# Patient Record
Sex: MALE | Race: BLACK OR AFRICAN AMERICAN | Employment: OTHER | URBAN - METROPOLITAN AREA
[De-identification: names, ages, dates, MRNs, and addresses within clinical notes are randomized per-mention and may not be internally consistent; named-entity substitution may affect disease eponyms.]

---

## 2017-07-15 ENCOUNTER — APPOINTMENT (OUTPATIENT)
Dept: CT IMAGING | Age: 42
End: 2017-07-15
Attending: EMERGENCY MEDICINE
Payer: OTHER GOVERNMENT

## 2017-07-15 ENCOUNTER — HOSPITAL ENCOUNTER (EMERGENCY)
Age: 42
Discharge: OTHER INSTITUTION | End: 2017-07-15
Attending: EMERGENCY MEDICINE
Payer: OTHER GOVERNMENT

## 2017-07-15 VITALS
DIASTOLIC BLOOD PRESSURE: 107 MMHG | BODY MASS INDEX: 21.67 KG/M2 | TEMPERATURE: 98.5 F | HEART RATE: 98 BPM | WEIGHT: 160 LBS | OXYGEN SATURATION: 98 % | SYSTOLIC BLOOD PRESSURE: 168 MMHG | RESPIRATION RATE: 19 BRPM | HEIGHT: 72 IN

## 2017-07-15 DIAGNOSIS — F10.939 ALCOHOL WITHDRAWAL, WITH UNSPECIFIED COMPLICATION (HCC): ICD-10-CM

## 2017-07-15 DIAGNOSIS — E83.42 HYPOMAGNESEMIA: ICD-10-CM

## 2017-07-15 DIAGNOSIS — R56.9 NEW ONSET SEIZURE (HCC): Primary | ICD-10-CM

## 2017-07-15 LAB
ANION GAP BLD CALC-SCNC: 17 MMOL/L (ref 3–18)
BASOPHILS # BLD AUTO: 0 K/UL (ref 0–0.06)
BASOPHILS # BLD: 1 % (ref 0–2)
BUN SERPL-MCNC: 10 MG/DL (ref 7–18)
BUN/CREAT SERPL: 9 (ref 12–20)
CALCIUM SERPL-MCNC: 10.4 MG/DL (ref 8.5–10.1)
CHLORIDE SERPL-SCNC: 89 MMOL/L (ref 100–108)
CO2 SERPL-SCNC: 26 MMOL/L (ref 21–32)
CREAT SERPL-MCNC: 1.16 MG/DL (ref 0.6–1.3)
DIFFERENTIAL METHOD BLD: ABNORMAL
EOSINOPHIL # BLD: 0 K/UL (ref 0–0.4)
EOSINOPHIL NFR BLD: 0 % (ref 0–5)
ERYTHROCYTE [DISTWIDTH] IN BLOOD BY AUTOMATED COUNT: 12.5 % (ref 11.6–14.5)
ETHANOL SERPL-MCNC: <3 MG/DL (ref 0–3)
GLUCOSE SERPL-MCNC: 121 MG/DL (ref 74–99)
HCT VFR BLD AUTO: 39.1 % (ref 36–48)
HGB BLD-MCNC: 13.9 G/DL (ref 13–16)
LYMPHOCYTES # BLD AUTO: 16 % (ref 21–52)
LYMPHOCYTES # BLD: 0.9 K/UL (ref 0.9–3.6)
MAGNESIUM SERPL-MCNC: 1.3 MG/DL (ref 1.6–2.6)
MCH RBC QN AUTO: 34.4 PG (ref 24–34)
MCHC RBC AUTO-ENTMCNC: 35.5 G/DL (ref 31–37)
MCV RBC AUTO: 96.8 FL (ref 74–97)
MONOCYTES # BLD: 1.1 K/UL (ref 0.05–1.2)
MONOCYTES NFR BLD AUTO: 19 % (ref 3–10)
NEUTS SEG # BLD: 3.8 K/UL (ref 1.8–8)
NEUTS SEG NFR BLD AUTO: 64 % (ref 40–73)
PLATELET # BLD AUTO: 200 K/UL (ref 135–420)
PMV BLD AUTO: 10.2 FL (ref 9.2–11.8)
POTASSIUM SERPL-SCNC: 3.6 MMOL/L (ref 3.5–5.5)
RBC # BLD AUTO: 4.04 M/UL (ref 4.7–5.5)
SODIUM SERPL-SCNC: 132 MMOL/L (ref 136–145)
WBC # BLD AUTO: 5.8 K/UL (ref 4.6–13.2)

## 2017-07-15 PROCEDURE — 83735 ASSAY OF MAGNESIUM: CPT | Performed by: EMERGENCY MEDICINE

## 2017-07-15 PROCEDURE — 96375 TX/PRO/DX INJ NEW DRUG ADDON: CPT

## 2017-07-15 PROCEDURE — 99285 EMERGENCY DEPT VISIT HI MDM: CPT

## 2017-07-15 PROCEDURE — 96365 THER/PROPH/DIAG IV INF INIT: CPT

## 2017-07-15 PROCEDURE — 74011250636 HC RX REV CODE- 250/636

## 2017-07-15 PROCEDURE — 74011250636 HC RX REV CODE- 250/636: Performed by: EMERGENCY MEDICINE

## 2017-07-15 PROCEDURE — 85025 COMPLETE CBC W/AUTO DIFF WBC: CPT | Performed by: EMERGENCY MEDICINE

## 2017-07-15 PROCEDURE — 70450 CT HEAD/BRAIN W/O DYE: CPT

## 2017-07-15 PROCEDURE — 96376 TX/PRO/DX INJ SAME DRUG ADON: CPT

## 2017-07-15 PROCEDURE — 74011250637 HC RX REV CODE- 250/637: Performed by: EMERGENCY MEDICINE

## 2017-07-15 PROCEDURE — 80307 DRUG TEST PRSMV CHEM ANLYZR: CPT | Performed by: EMERGENCY MEDICINE

## 2017-07-15 PROCEDURE — 80048 BASIC METABOLIC PNL TOTAL CA: CPT | Performed by: EMERGENCY MEDICINE

## 2017-07-15 RX ORDER — LORAZEPAM 2 MG/ML
3 INJECTION INTRAMUSCULAR
Status: DISCONTINUED | OUTPATIENT
Start: 2017-07-15 | End: 2017-07-15 | Stop reason: HOSPADM

## 2017-07-15 RX ORDER — LORAZEPAM 2 MG/ML
1 INJECTION INTRAMUSCULAR
Status: DISCONTINUED | OUTPATIENT
Start: 2017-07-15 | End: 2017-07-15 | Stop reason: HOSPADM

## 2017-07-15 RX ORDER — THERA TABS 400 MCG
1 TAB ORAL DAILY
Status: DISCONTINUED | OUTPATIENT
Start: 2017-07-15 | End: 2017-07-15 | Stop reason: HOSPADM

## 2017-07-15 RX ORDER — MAGNESIUM SULFATE HEPTAHYDRATE 40 MG/ML
INJECTION, SOLUTION INTRAVENOUS
Status: COMPLETED
Start: 2017-07-15 | End: 2017-07-15

## 2017-07-15 RX ORDER — LORAZEPAM 2 MG/ML
2 INJECTION INTRAMUSCULAR
Status: COMPLETED | OUTPATIENT
Start: 2017-07-15 | End: 2017-07-15

## 2017-07-15 RX ORDER — FOLIC ACID 1 MG/1
1 TABLET ORAL DAILY
Status: DISCONTINUED | OUTPATIENT
Start: 2017-07-15 | End: 2017-07-15 | Stop reason: HOSPADM

## 2017-07-15 RX ORDER — LORAZEPAM 1 MG/1
1 TABLET ORAL
Status: DISCONTINUED | OUTPATIENT
Start: 2017-07-15 | End: 2017-07-15 | Stop reason: HOSPADM

## 2017-07-15 RX ORDER — LORAZEPAM 1 MG/1
2 TABLET ORAL
Status: DISCONTINUED | OUTPATIENT
Start: 2017-07-15 | End: 2017-07-15 | Stop reason: HOSPADM

## 2017-07-15 RX ORDER — SODIUM CHLORIDE 0.9 % (FLUSH) 0.9 %
5-10 SYRINGE (ML) INJECTION EVERY 8 HOURS
Status: DISCONTINUED | OUTPATIENT
Start: 2017-07-15 | End: 2017-07-15 | Stop reason: HOSPADM

## 2017-07-15 RX ORDER — ASPIRIN 325 MG/1
100 TABLET, FILM COATED ORAL DAILY
Status: DISCONTINUED | OUTPATIENT
Start: 2017-07-15 | End: 2017-07-15 | Stop reason: HOSPADM

## 2017-07-15 RX ORDER — TELMISARTAN 40 MG/1
40 TABLET ORAL DAILY
COMMUNITY

## 2017-07-15 RX ORDER — SODIUM CHLORIDE 0.9 % (FLUSH) 0.9 %
5-10 SYRINGE (ML) INJECTION AS NEEDED
Status: DISCONTINUED | OUTPATIENT
Start: 2017-07-15 | End: 2017-07-15 | Stop reason: HOSPADM

## 2017-07-15 RX ORDER — LORAZEPAM 2 MG/ML
2 INJECTION INTRAMUSCULAR
Status: DISCONTINUED | OUTPATIENT
Start: 2017-07-15 | End: 2017-07-15 | Stop reason: HOSPADM

## 2017-07-15 RX ORDER — MAGNESIUM SULFATE HEPTAHYDRATE 500 MG/ML
2 INJECTION, SOLUTION INTRAMUSCULAR; INTRAVENOUS
Status: DISCONTINUED | OUTPATIENT
Start: 2017-07-15 | End: 2017-07-15 | Stop reason: HOSPADM

## 2017-07-15 RX ADMIN — Medication 10 ML: at 09:51

## 2017-07-15 RX ADMIN — LORAZEPAM 2 MG: 2 INJECTION INTRAMUSCULAR; INTRAVENOUS at 11:07

## 2017-07-15 RX ADMIN — FOLIC ACID 1 MG: 1 TABLET ORAL at 09:50

## 2017-07-15 RX ADMIN — MAGNESIUM SULFATE HEPTAHYDRATE 2 G: 40 INJECTION, SOLUTION INTRAVENOUS at 06:44

## 2017-07-15 RX ADMIN — LORAZEPAM 2 MG: 2 INJECTION INTRAMUSCULAR; INTRAVENOUS at 09:25

## 2017-07-15 RX ADMIN — THERA TABS 1 TABLET: TAB at 09:50

## 2017-07-15 RX ADMIN — Medication 100 MG: at 09:50

## 2017-07-15 NOTE — ED NOTES
Called Report to Akin Salcido, to Bryanna Martell RN. At [phone]. [facility] transport has been contacted, and will arrive shortly to transport patient.

## 2017-07-15 NOTE — DISCHARGE INSTRUCTIONS
SPECIFIC PATIENT INSTRUCTIONS FROM THE PHYSICIAN WHO TREATED YOU IN THE ER TODAY:  1. Return if any concerns or worsening of condition(s)  2. Follow up with your neurologist or the one listed in these discharge instructions in 1-2 days. Seizure: Care Instructions  Your Care Instructions    Seizures are caused by abnormal patterns of electrical signals in the brain. They are different for each person. Seizures can affect movement, speech, vision, or awareness. Some people have only slight shaking of a hand and do not pass out. Other people may pass out and have violent shaking of the whole body. Some people appear to stare into space. They are awake, but they can't respond normally. Later, they may not remember what happened. You may need tests to identify the type and cause of the seizures. A seizure may occur only once, or you may have them more than one time. Taking medicines as directed and following up with your doctor may help keep you from having more seizures. The doctor has checked you carefully, but problems can develop later. If you notice any problems or new symptoms, get medical treatment right away. Follow-up care is a key part of your treatment and safety. Be sure to make and go to all appointments, and call your doctor if you are having problems. It's also a good idea to know your test results and keep a list of the medicines you take. How can you care for yourself at home? · Be safe with medicines. Take your medicines exactly as prescribed. Call your doctor if you think you are having a problem with your medicine. · Do not do any activity that could be dangerous to you or others until your doctor says it is safe to do so. For example, do not drive a car, operate machinery, swim, or climb ladders. · Be sure that anyone treating you for any health problem knows that you have had a seizure and what medicines you are taking for it.   · Identify and avoid things that may make you more likely to have a seizure. These may include lack of sleep, alcohol or drug use, stress, or not eating. · Make sure you go to your follow-up appointment. When should you call for help? Call 911 anytime you think you may need emergency care. For example, call if:  · You have another seizure. · You have more than one seizure in 24 hours. · You have new symptoms, such as trouble walking, speaking, or thinking clearly. Call your doctor now or seek immediate medical care if:  · You are not acting normally. Watch closely for changes in your health, and be sure to contact your doctor if you have any problems. Where can you learn more? Go to http://jeevan-neel.info/. Enter Q143 in the search box to learn more about \"Seizure: Care Instructions. \"  Current as of: October 14, 2016  Content Version: 11.3  © 4109-2077 [employer]. Care instructions adapted under license by [employer] (which disclaims liability or warranty for this information). If you have questions about a medical condition or this instruction, always ask your healthcare professional. [address] any warranty or liability for your use of this information. TripAdvisor Activation    Thank you for requesting access to [employer]visor. Please follow the instructions below to securely access and download your online medical record. TripAdvisor allows you to send messages to your doctor, view your test results, renew your prescriptions, schedule appointments, and more. How Do I Sign Up? 1. In your internet browser, go to [email or web address]. 2. Click on the First Time User? Click Here link in the Sign In box. You will see the New Member Sign Up page. 3. Enter your TripAdvisor Access Code exactly as it appears below. You will not need to use this code after youve completed the sign-up process. If you do not sign up before the expiration date, you must request a new code.     TripAdvisor Access Code: [email or web address]  Expires: 10/13/2017  5:52 AM (This is the date your [address] access code will )    4. Enter the last four digits of your Social Security Number (xxxx) and Date of Birth (mm/dd/yyyy) as indicated and click Submit. You will be taken to the next sign-up page. 5. Create a [facility] [employer] ID. This will be your [employer] login ID and cannot be changed, so think of one that is secure and easy to remember. 6. Create a [address] [employer] password. You can change your password at any time. 7. Enter your Password Reset Question and Answer. This can be used at a later time if you forget your password. 8. Enter your e-mail address. You will receive e-mail notification when new information is available in [address] 9. Click Sign Up. You can now view and download portions of your medical record. 10. Click the Download Summary menu link to download a portable copy of your medical information. Additional Information    If you have questions, please visit the Frequently Asked Questions section of the [employer] website at [email or web address] ([email or web address] Remember, [address] is NOT to be used for urgent needs. For medical emergencies, dial 911.

## 2017-07-15 NOTE — ED TRIAGE NOTES
Patient arrived via [facility] with report of patient \"falling out\" and having seizure like activity. EMS reports patient awake and confused upon arrival. Patient is in the [employer]. Patient has no idea why he is in [place] or what happened to him tonight.

## 2017-07-15 NOTE — ED NOTES
9:21 AM I was called to pt's bedside for concern over ETOH withdrawal. Pt is diaphoretic and tremors. CIWA protocol initiated. 10:09 AM Rebecca Vazquez is a 35 y.o. male with a hx of HTN who presents to the ED via [facility] for evaluation of a witnessed seizure that occurred this morning. The pt arrived to the ED post-ictal. The pt reports that he has had previous 2 seizures but does not recall when. He denies taking any seizure medication. The pt states that he consumes ETOH daily. He notes that his daughter has epilepsy. Diagnosis:   1. New onset seizure ([address]    2. Hypomagnesemia    3. Alcohol withdrawal, with unspecified complication ([facility] [address]        Disposition: transfer to [address] [place] [address]     Follow up With Details Comments Contact Info    Dangelo Ochoa MD Schedule an appointment as soon as possible for a visit  [address] [place] [id number] [phone]      [facility] EMERGENCY DEPT  As needed, If symptoms worsen [address] [facility] [address] [phone]          Patient's Medications   Start Taking    No medications on file   Continue Taking    TELMISARTAN (MICARDIS) 40 MG TABLET    Take 40 mg by mouth daily. These Medications have changed    No medications on file   Stop Taking    No medications on file     SCRIBE ATTESTATION STATEMENT  Documented by: Satish Case for, and in the presence of, Trisha Glaser MD 07/15/17 11:44 AM     Signed by: Jarek Gonzalez, 07/15/17 11:44 AM     PROVIDER ATTESTATION STATEMENT  I personally performed the services described in the documentation, reviewed the documentation, as recorded by the scribe in my presence, and it accurately and completely records my words and actions.   Trisha Glaser MD

## 2017-07-15 NOTE — ED NOTES
Patient states that he is slowly becoming aware of what happened to him. He knows that he is in [place] that his wife dropped him off at the airport and that his on his way to deployment in [facility] and that [place] was on of the stops.  He knows that people are telling him that he had a seizure and he thinks that he may have had one in the past.

## 2017-07-15 NOTE — ED PROVIDER NOTES
[facility] EMERGENCY DEPT      35 y.o. male with noted past medical history of HTN, who presents to the emergency department via [facility] for seizure earlier today. EMS was activated because patient \"fell out\" and have seizure like activity. Patient was awake and confused upon arrival. Patient had no recollection of the event. Per patient, he had one seizure before. He was unable to recall when did this happened. He reports tongue injury and swelling to right side scalp. Patient's 10year old daughter has epilepsy. Patient is in the [employer]. No current facility-administered medications for this encounter. Current Outpatient Prescriptions   Medication Sig    telmisartan (MICARDIS) 40 mg tablet Take 40 mg by mouth daily. Past Medical History:   Diagnosis Date    Hypertension        Past Surgical History:   Procedure Laterality Date    HX CHOLECYSTECTOMY         History reviewed. No pertinent family history. Social History     Social History    Marital status:      Spouse name: N/A    Number of children: N/A    Years of education: N/A     Occupational History    Not on file. Social History Main Topics    Smoking status: Never Smoker    Smokeless tobacco: Never Used    Alcohol use Yes    Drug use: No    Sexual activity: Not on file     Other Topics Concern    Not on file     Social History Narrative    No narrative on file       No Known Allergies    Patient's primary care provider (as noted in EPIC):  None    REVIEW OF SYSTEMS:    Constitutional:  Negative for diaphoresis. HENT:  Negative for congestion. Positive for tongue pain and swelling at right side scalp. Respiratory:  Negative for cough and shortness of breath. Cardiovascular:  Negative for chest pain and palpitations. Gastrointestinal:  Negative for diarrhea. Genitourinary:  Negative for flank pain. Musculoskeletal:  Negative for back pain. Skin:  Negative for pallor.    Neurological:  Negative for focal numbness, weakness or tingling. Negative for slurred speech. Visit Vitals    BP (!) 158/107    Pulse (!) 105    Temp 98.5 °F (36.9 °C)    Resp 18    Ht 6' (1.829 m)    Wt 72.6 kg (160 lb)    SpO2 97%    BMI 21.7 kg/m2       PHYSICAL EXAM:    CONSTITUTIONAL:  Alert, in no apparent distress;  well developed;  well nourished. HEAD:  Normocephalic. Right temporal head swelling and abrasion. EYES:  EOMI. Non-icteric sclera. Normal conjunctiva. ENTM:  Nose:  no rhinorrhea. Throat:  no erythema or exudate, mucous membranes moist.  Left distal tongue bite noted. NECK:  No JVD. Supple  RESPIRATORY:  Chest clear, equal breath sounds, good air movement. CARDIOVASCULAR:  Regular rate and rhythm. No murmurs, rubs, or gallops. GI:  Normal bowel sounds, abdomen soft and non-tender. No rebound or guarding. BACK:  Non-tender. UPPER EXT:  Normal inspection. LOWER EXT:  No edema, no calf tenderness. Distal pulses intact. NEURO:  Moves all four extremities. Normal motor exam and sensation in all four extremities. Normal CN II-XII exam.  Normal bilateral finger-to-nose exam.     SKIN:  No rashes;  Normal for age. PSYCH:  Alert and normal affect. DIFFERENTIAL DIAGNOSES/ MEDICAL DECISION MAKING: New onset seizures: idiopathic, electrolyte abnormality/ies, head trauma, intracranial hemorrhage, alcohol use or withdrawal, illicit drug use or withdrawal, infection/ meningitis, and/or decreased sleep lowering threshold, sepsis, other etiologies versus combination of the above.       ED COURSE:      Abnormal lab results from this emergency department encounter:  Labs Reviewed   CBC WITH AUTOMATED DIFF - Abnormal; Notable for the following:        Result Value    RBC 4.04 (*)     MCH 34.4 (*)     LYMPHOCYTES 16 (*)     MONOCYTES 19 (*)     All other components within normal limits   METABOLIC PANEL, BASIC - Abnormal; Notable for the following:     Sodium 132 (*)     Chloride 89 (*)     Glucose 121 (*) BUN/Creatinine ratio 9 (*)     Calcium 10.4 (*)     All other components within normal limits   MAGNESIUM - Abnormal; Notable for the following:     Magnesium 1.3 (*)     All other components within normal limits       Lab values for this patient within approximately the last 12 hours:  Recent Results (from the past 12 hour(s))   CBC WITH AUTOMATED DIFF    Collection Time: 07/15/17  4:20 AM   Result Value Ref Range    WBC 5.8 4.6 - 13.2 K/uL    RBC 4.04 (L) 4.70 - 5.50 M/uL    HGB 13.9 13.0 - 16.0 g/dL    HCT 39.1 36.0 - 48.0 %    MCV 96.8 74.0 - 97.0 FL    MCH 34.4 (H) 24.0 - 34.0 PG    MCHC 35.5 31.0 - 37.0 g/dL    RDW 12.5 11.6 - 14.5 %    PLATELET 777 143 - 369 K/uL    MPV 10.2 9.2 - 11.8 FL    NEUTROPHILS 64 40 - 73 %    LYMPHOCYTES 16 (L) 21 - 52 %    MONOCYTES 19 (H) 3 - 10 %    EOSINOPHILS 0 0 - 5 %    BASOPHILS 1 0 - 2 %    ABS. NEUTROPHILS 3.8 1.8 - 8.0 K/UL    ABS. LYMPHOCYTES 0.9 0.9 - 3.6 K/UL    ABS. MONOCYTES 1.1 0.05 - 1.2 K/UL    ABS. EOSINOPHILS 0.0 0.0 - 0.4 K/UL    ABS. BASOPHILS 0.0 0.0 - 0.06 K/UL    DF AUTOMATED     METABOLIC PANEL, BASIC    Collection Time: 07/15/17  4:20 AM   Result Value Ref Range    Sodium 132 (L) 136 - 145 mmol/L    Potassium 3.6 3.5 - 5.5 mmol/L    Chloride 89 (L) 100 - 108 mmol/L    CO2 26 21 - 32 mmol/L    Anion gap 17 3.0 - 18 mmol/L    Glucose 121 (H) 74 - 99 mg/dL    BUN 10 7.0 - 18 MG/DL    Creatinine 1.16 0.6 - 1.3 MG/DL    BUN/Creatinine ratio 9 (L) 12 - 20      GFR est AA >60 >60 ml/min/1.73m2    GFR est non-AA >60 >60 ml/min/1.73m2    Calcium 10.4 (H) 8.5 - 10.1 MG/DL   MAGNESIUM    Collection Time: 07/15/17  4:20 AM   Result Value Ref Range    Magnesium 1.3 (L) 1.6 - 2.6 mg/dL       Radiologist and cardiologist interpretations if available at time of this note:  No results found.     Medication(s) ordered for patient during this emergency visit encounter:  Medications - No data to display    CT head per radiologist's report:  CT HEAD WO CONT    (Results Pending)   Head CT without IV contrast preliminary read showed  1. No acute intracranial abnormalities  2. Right temporal scalp hematoma  3. 2 mm colloid cyst at the left foramen of [place]    IMPRESSION AND MEDICAL DECISION MAKING:  Based upon the patients presentation with noted HPI and PE, along with the work up done in the emergency department, I believe that the patient is having new onset seizure(s). Work up and evaluation in the emergency department show no electrolyte imbalance, infection, or acute neurological process such as stroke or ICH as the etiology of the seizure(s). I believe that the patient can be discharged home with outpatient follow up with a neurologist.     DIAGNOSIS:  1. New onset seizure  2. Tongue bite, secondary to #1. 3. Hypomagnesemia. SPECIFIC PATIENT INSTRUCTIONS FROM THE PHYSICIAN WHO TREATED YOU IN THE ER TODAY:  1. Return if any concerns or worsening of condition(s)  2. Follow up with your neurologist or the one listed in these discharge instructions in 1-2 days. CANDY Jay, acting as a scribe for and in the presence of Dr. Venkata Allan M.D  Signed By: Kymberly Broderick, scribe for Dr. Venkata Allan M.D    Provider Attestation:  If a scribe was utilized in generation of this patient record, I personally performed the services described in the documentation, reviewed the documentation, as recorded by the scribe in my presence, and it accurately records the patient's history of presenting illness, review of systems, patient physical examination, and procedures performed by me as the attending physician. Noel Arenas M.D.   OCTAVIO Board Certified Emergency Physician  7/15/2017.  4:42 AM